# Patient Record
Sex: MALE | Race: WHITE | NOT HISPANIC OR LATINO | URBAN - METROPOLITAN AREA
[De-identification: names, ages, dates, MRNs, and addresses within clinical notes are randomized per-mention and may not be internally consistent; named-entity substitution may affect disease eponyms.]

---

## 2018-06-01 ENCOUNTER — OFFICE VISIT (OUTPATIENT)
Dept: FAMILY MEDICINE | Facility: CLINIC | Age: 28
End: 2018-06-01
Payer: COMMERCIAL

## 2018-06-01 VITALS
OXYGEN SATURATION: 98 % | WEIGHT: 251.75 LBS | TEMPERATURE: 98.5 F | DIASTOLIC BLOOD PRESSURE: 82 MMHG | SYSTOLIC BLOOD PRESSURE: 128 MMHG | HEIGHT: 78 IN | HEART RATE: 84 BPM | BODY MASS INDEX: 29.13 KG/M2

## 2018-06-01 DIAGNOSIS — R09.A2 GLOBUS SENSATION: Primary | ICD-10-CM

## 2018-06-01 DIAGNOSIS — K21.9 GASTROESOPHAGEAL REFLUX DISEASE WITHOUT ESOPHAGITIS: ICD-10-CM

## 2018-06-01 DIAGNOSIS — F41.9 ANXIETY: ICD-10-CM

## 2018-06-01 DIAGNOSIS — G47.09 OTHER INSOMNIA: ICD-10-CM

## 2018-06-01 RX ORDER — TRAZODONE HYDROCHLORIDE 50 MG/1
50 TABLET, FILM COATED ORAL
Qty: 30 TABLET | Refills: 1 | Status: SHIPPED | OUTPATIENT
Start: 2018-06-01 | End: 2018-06-18

## 2018-06-01 NOTE — NURSING NOTE
"27 year old  Chief Complaint   Patient presents with     Throat Problem     Sore throat for 2 months     Patient Request     pt wants to discuss anxiety       Blood pressure 128/82, pulse 84, temperature 98.5  F (36.9  C), temperature source Oral, height 6' 6.15\" (198.5 cm), weight 251 lb 12 oz (114.2 kg), SpO2 98 %. Body mass index is 28.98 kg/(m^2).  There is no problem list on file for this patient.      Wt Readings from Last 2 Encounters:   06/01/18 251 lb 12 oz (114.2 kg)     BP Readings from Last 3 Encounters:   06/01/18 128/82         No current outpatient prescriptions on file.     No current facility-administered medications for this visit.        Social History   Substance Use Topics     Smoking status: Never Smoker     Smokeless tobacco: Never Used     Alcohol use Yes      Comment: 15 drinks a week       Health Maintenance Due   Topic Date Due     TETANUS IMMUNIZATION (SYSTEM ASSIGNED)  12/12/2006     HIV SCREEN (SYSTEM ASSIGNED)  12/12/2006       No results found for: PAP      June 1, 2018 3:52 PM  "

## 2018-06-01 NOTE — PROGRESS NOTES
"Jg Wayne is a 27 year old male, new to [facility]. He is here for the following issues:    Neck symptoms    Jg is a 19 yo male with a two month history of neck discomfort.  He describes an achy sensation and fullness at the base of his neck on the left side. Sometimes it feels bruised to touch. No fever. He has seen an ENT doctor twice and they did not find anything on detailed exam.  He was worried that he might have esophageal cancer. It is not smoke but he does drink 2 to 3 alcoholic beverages a day.  No fevers night sweats.     Heartburn   He describes intermittent heartburn. He was on prilosec 2 yr ago for a period of 3 mos. At that time he was having dysphagia, and globus sensation. An ENT doctor diagnosed him with GERD. Symptoms improved after 3 months of prilosec. . Six months ago, he had a recurrence. He tried prilosec again x 3 months but did not feel it helped this time.  At first, he thought he may have strained a muscle at the left side of his neck because it felt bruised to touch. He is not a smoker. He drinks alcohol,  2 to 3 per day.  Does not drink coffee for the past three weeks, no NSAID use.  Voice sounds a bit raspy.    Anxiety   He reports feeling anxious \"all my life\".  Symptoms worsened after college. No hx of abuse or PTSD. No hx of chemical dependency.  He admits to worrying a lot and describes himself as a hypochondriac. Get panic attacks 1 to 2 times per week.  Sleep pattern has changed. He used to go to bed at 10 o'clock but now feels exhausted by 8 PM and falls asleep. With that, he was awakening from sleep between 3 and 4 AM and then is wide-awake. He feels very sleepy during the day in this trying not to nap.  Is using 5-HTP and melatonin at bedtime. Also taking VALENTINE 350mg daily.  Is never taking prescription medications for anxiety or for sleep. He is interested in counseling.  He met with the counselor twice in the past but has no current therapist.  He reports losing 5 " "pounds due to anxiety. He is unsure if he suffers depression.     PHQ9 score = 19 (no SI)  KVNG 7 score = 18      Patient Active Problem List   Diagnosis     Other insomnia     Anxiety     Gastroesophageal reflux disease without esophagitis     Globus sensation     Family History   Problem Relation Age of Onset     Anxiety Disorder Maternal Aunt      Depression Maternal Aunt      Past Surgical History:   Procedure Laterality Date     NO HISTORY OF SURGERY         Social  , no children   for [employer], stressful job    HABITS:  Tob: never  ETOH: 2-3 per day  Caffeine: none x 3 wks  Exercise: weights once a week    Monogamous with female spouse  Condom use  No hx of STDs    Patient Active Problem List   Diagnosis     Other insomnia     Anxiety     Gastroesophageal reflux disease without esophagitis     Globus sensation       No current outpatient prescriptions on file.       Allergies not on file     EXAM  /82  Pulse 84  Temp 98.5  F (36.9  C) (Oral)  Ht 6' 6.15\" (198.5 cm)  Wt 251 lb 12 oz (114.2 kg)  SpO2 98%  BMI 28.98 kg/m2  Gen: Alert, pleasant, NAD  HEENT:  Conjunctiva nl, TM normal bilaterally, OP clear, no posterior erythema  Neck: no LAD or TM, no tenderness  COR: S1,S2, no murmur  Lungs: CTA bilaterally, no rhonchi, wheezes or rales  Abdomen: soft, midepigastric tenderness, normal bowel sounds, no HSM  Ext: no peripheral edema, pulses full      Assessment:  (F45.8) Globus sensation  (primary encounter diagnosis)  Comment:   He has had 2 ENT visits, unremarkable, DDX is acid reflux with mild dysphagia, anxiety, other process such as lymphadenopathy  Plan: suspect this is more likely stress or acid reflux, treat as below and follow up if not improving    (K21.9) Gastroesophageal reflux disease without esophagitis  Comment: intermittent burning  Plan: CBC with platelets, H Pylori antigen stool,         Comprehensive metabolic panel, CANCELED: H.         Pylori Antibody IGG  " ([facility]), CANCELED:         Comprehensive Metabolic Panel ([place])        Discussed checking for h pylori today and need to stop drinking coffee, alcohol, no NSAIDs  Use prilosec 20mg daily on empty stomach daily x 30 days   Follow up in 2 wks    (F41.9) Anxiety  Comment: ongoing daily anxiety with some panic attack. Noted insomnia  Plan: TSH with free T4 reflex        Recommend looking for reversible causes  Recommend he find a therapist, [employer] and psychology today websites are suggested  Briefly discussed SSRI but will hold on this today, return in 2 wks    (G47.09) Other insomnia  Comment: longstanding issue, trouble falling asleep.   Plan: traZODone (DESYREL) 50 MG tablet        Trial of 25-50mg dose q hs . Recommend he try to push bedtime to 9pm - 9 : 30pm  if possible. No screen use. No napping, stopu using alcohol.     Follow up in 2 wks    Total visit time today 40 minutes.  More than 50% of the time was spent in counseling and coordination of care      Selma López MD  Internal Medicine/Pediatrics

## 2018-06-01 NOTE — LETTER
[facility]  [address]  [place] [address]  Phone: [phone]  Fax: [phone]       Date: June 5, 2018      Jg Wayne  [address]   [place] [address]        Dear Jg,    Enclosed are your test results.    Your thyroid level (TSH) is normal.     Your glucose is slightly low, but kidney and liver tests are normal.     Your blood count profile looks perfect, there is no anemia or other abnormal findings.    The H. Pylori test is pending.    Sincerely,    Selma López MD  Internal Medicine/Pediatrics    Resulted Orders   CBC with platelets   Result Value Ref Range    WBC 7.3 4.0 - 11.0 10e9/L    RBC Count 5.23 4.4 - 5.9 10e12/L    Hemoglobin 16.4 13.3 - 17.7 g/dL    Hematocrit 47.7 40.0 - 53.0 %    MCV 91 78 - 100 fl    MCH 31.4 26.5 - 33.0 pg    MCHC 34.4 31.5 - 36.5 g/dL    RDW 12.4 10.0 - 15.0 %    Platelet Count 255 150 - 450 10e9/L   TSH with free T4 reflex   Result Value Ref Range    TSH 1.27 0.40 - 4.00 mU/L   Comprehensive metabolic panel   Result Value Ref Range    Sodium 140 133 - 144 mmol/L    Potassium 4.9 3.4 - 5.3 mmol/L    Chloride 106 94 - 109 mmol/L    Carbon Dioxide 29 20 - 32 mmol/L    Anion Gap 5 3 - 14 mmol/L    Glucose 66 (L) 70 - 99 mg/dL    Urea Nitrogen 10 7 - 30 mg/dL    Creatinine 1.00 0.66 - 1.25 mg/dL    GFR Estimate 88 >60 mL/min/1.7m2      Comment:      Non  GFR Calc    GFR Estimate If Black >90 >60 mL/min/1.7m2      Comment:       GFR Calc    Calcium 9.2 8.5 - 10.1 mg/dL    Bilirubin Total 0.4 0.2 - 1.3 mg/dL    Albumin 4.4 3.4 - 5.0 g/dL    Protein Total 7.4 6.8 - 8.8 g/dL    Alkaline Phosphatase 63 40 - 150 U/L    ALT 23 0 - 70 U/L    AST 18 0 - 45 U/L

## 2018-06-01 NOTE — MR AVS SNAPSHOT
"              After Visit Summary   2018    Jg Wayne    MRN: [id number]           Patient Information     Date Of Birth          1990        Visit Information        Provider Department      2018 4:00 PM Selma López MD [facility]        Today's Diagnoses     Globus sensation    -  1    Gastroesophageal reflux disease without esophagitis        Anxiety        Other insomnia           Follow-ups after your visit        Who to contact     Please call your clinic at [phone] to:    Ask questions about your health    Make or cancel appointments    Discuss your medicines    Learn about your test results    Speak to your doctor            Additional Information About Your Visit        MyChart Information     [employer] is an electronic gateway that provides easy, online access to your medical records. With [employer], you can request a clinic appointment, read your test results, renew a prescription or communicate with your care team.     To sign up for [employer] visit the website at [email or web address]   You will be asked to enter the access code listed below, as well as some personal information. Please follow the directions to create your username and password.     Your access code is: [email or web address]  Expires: 2018  4:19 PM     Your access code will  in 90 days. If you need help or a new code, please contact your [facility] or call [phone] for assistance.        Care EveryWhere ID     This is your Care EveryWhere ID. This could be used by other organizations to access your [facility] medical records  [id number]        Your Vitals Were     Pulse Temperature Height Pulse Oximetry BMI (Body Mass Index)       84 98.5  F (36.9  C) (Oral) 6' 6.15\" (198.5 cm) 98% 28.98 kg/m2        Blood Pressure from Last 3 Encounters:   18 128/82    Weight from Last 3 Encounters:   18 251 lb 12 oz (114.2 kg)              We Performed the " Following     CBC with platelets     Comprehensive Metabolic Panel ([place])     H. Pylori Antibody IGG  ([facility])     TSH with free T4 reflex          Today's Medication Changes          These changes are accurate as of 6/1/18  4:31 PM.  If you have any questions, ask your nurse or doctor.               Start taking these medicines.        Dose/Directions    traZODone 50 MG tablet   Commonly known as:  DESYREL   Used for:  Other insomnia   Started by:  Selma López MD        Dose:  50 mg   Take 1 tablet (50 mg) by mouth nightly as needed for sleep   Quantity:  30 tablet   Refills:  1            Where to get your medicines      These medications were sent to [facility] [address] [facility] - [place], [place] - [address]  [address], [place] [address]     Phone:  [phone]     traZODone 50 MG tablet                Primary Care Provider Office Phone # Fax #    Paradise Coy PA-C [phone]       [address] [place] [address]        Equal Access to Services     KALEB HERRERA : Hadii rere ku hadasho Soomaali, waaxda luqadaha, qaybta kaalmada adeegyada, mignon merrill . So [facility] [phone].    ATENCIÓN: Si habla español, tiene a ashley disposición servicios gratuitos de asistencia lingüística. Llame al [phone].    We comply with applicable federal civil rights laws and [place] laws. We do not discriminate on the basis of race, color, national origin, age, disability, sex, sexual orientation, or gender identity.            Thank you!     Thank you for choosing [facility]  for your care. Our goal is always to provide you with excellent care. Hearing back from our patients is one way we can continue to improve our services. Please take a few minutes to complete the written survey that you may receive in the mail after your visit with us. Thank you!             Your Updated Medication List - Protect others around you: Learn how to safely  use, store and throw away your medicines at www.disposemymeds.org.          This list is accurate as of 6/1/18  4:31 PM.  Always use your most recent med list.                   Brand Name Dispense Instructions for use Diagnosis    omeprazole 20 MG CR capsule    priLOSEC    90 capsule    Take 1 capsule (20 mg) by mouth daily        traZODone 50 MG tablet    DESYREL    30 tablet    Take 1 tablet (50 mg) by mouth nightly as needed for sleep    Other insomnia

## 2018-06-02 PROBLEM — G47.09 OTHER INSOMNIA: Status: ACTIVE | Noted: 2018-06-02

## 2018-06-02 PROBLEM — F41.9 ANXIETY: Status: ACTIVE | Noted: 2018-06-02

## 2018-06-02 PROBLEM — K21.9 GASTROESOPHAGEAL REFLUX DISEASE WITHOUT ESOPHAGITIS: Status: ACTIVE | Noted: 2018-06-02

## 2018-06-02 PROBLEM — R09.A2 GLOBUS SENSATION: Status: ACTIVE | Noted: 2018-06-02

## 2018-06-02 LAB
ALBUMIN SERPL-MCNC: 4.4 G/DL (ref 3.4–5)
ALP SERPL-CCNC: 63 U/L (ref 40–150)
ALT SERPL W P-5'-P-CCNC: 23 U/L (ref 0–70)
ANION GAP SERPL CALCULATED.3IONS-SCNC: 5 MMOL/L (ref 3–14)
AST SERPL W P-5'-P-CCNC: 18 U/L (ref 0–45)
BILIRUB SERPL-MCNC: 0.4 MG/DL (ref 0.2–1.3)
BUN SERPL-MCNC: 10 MG/DL (ref 7–30)
CALCIUM SERPL-MCNC: 9.2 MG/DL (ref 8.5–10.1)
CHLORIDE SERPL-SCNC: 106 MMOL/L (ref 94–109)
CO2 SERPL-SCNC: 29 MMOL/L (ref 20–32)
CREAT SERPL-MCNC: 1 MG/DL (ref 0.66–1.25)
ERYTHROCYTE [DISTWIDTH] IN BLOOD BY AUTOMATED COUNT: 12.4 % (ref 10–15)
GFR SERPL CREATININE-BSD FRML MDRD: 88 ML/MIN/1.7M2
GLUCOSE SERPL-MCNC: 66 MG/DL (ref 70–99)
HCT VFR BLD AUTO: 47.7 % (ref 40–53)
HGB BLD-MCNC: 16.4 G/DL (ref 13.3–17.7)
MCH RBC QN AUTO: 31.4 PG (ref 26.5–33)
MCHC RBC AUTO-ENTMCNC: 34.4 G/DL (ref 31.5–36.5)
MCV RBC AUTO: 91 FL (ref 78–100)
PLATELET # BLD AUTO: 255 10E9/L (ref 150–450)
POTASSIUM SERPL-SCNC: 4.9 MMOL/L (ref 3.4–5.3)
PROT SERPL-MCNC: 7.4 G/DL (ref 6.8–8.8)
RBC # BLD AUTO: 5.23 10E12/L (ref 4.4–5.9)
SODIUM SERPL-SCNC: 140 MMOL/L (ref 133–144)
TSH SERPL DL<=0.005 MIU/L-ACNC: 1.27 MU/L (ref 0.4–4)
WBC # BLD AUTO: 7.3 10E9/L (ref 4–11)

## 2018-06-04 ASSESSMENT — ANXIETY QUESTIONNAIRES
1. FEELING NERVOUS, ANXIOUS, OR ON EDGE: NEARLY EVERY DAY
IF YOU CHECKED OFF ANY PROBLEMS ON THIS QUESTIONNAIRE, HOW DIFFICULT HAVE THESE PROBLEMS MADE IT FOR YOU TO DO YOUR WORK, TAKE CARE OF THINGS AT HOME, OR GET ALONG WITH OTHER PEOPLE: EXTREMELY DIFFICULT
5. BEING SO RESTLESS THAT IT IS HARD TO SIT STILL: MORE THAN HALF THE DAYS
GAD7 TOTAL SCORE: 18
6. BECOMING EASILY ANNOYED OR IRRITABLE: SEVERAL DAYS
7. FEELING AFRAID AS IF SOMETHING AWFUL MIGHT HAPPEN: NEARLY EVERY DAY
2. NOT BEING ABLE TO STOP OR CONTROL WORRYING: NEARLY EVERY DAY
3. WORRYING TOO MUCH ABOUT DIFFERENT THINGS: NEARLY EVERY DAY

## 2018-06-04 ASSESSMENT — PATIENT HEALTH QUESTIONNAIRE - PHQ9: 5. POOR APPETITE OR OVEREATING: NEARLY EVERY DAY

## 2018-06-05 ASSESSMENT — PATIENT HEALTH QUESTIONNAIRE - PHQ9: SUM OF ALL RESPONSES TO PHQ QUESTIONS 1-9: 19

## 2018-06-05 ASSESSMENT — ANXIETY QUESTIONNAIRES: GAD7 TOTAL SCORE: 18

## 2018-06-18 DIAGNOSIS — G47.09 OTHER INSOMNIA: ICD-10-CM

## 2018-06-18 RX ORDER — TRAZODONE HYDROCHLORIDE 50 MG/1
TABLET, FILM COATED ORAL
Qty: 60 TABLET | Refills: 0 | Status: SHIPPED | OUTPATIENT
Start: 2018-06-18

## 2018-06-18 NOTE — TELEPHONE ENCOUNTER
Patient was seen by Dr.. López on June 1st. 2018.  She started him on Trazodone 50 mg. She wanted him to trial 25- 50 mg. And follow-up in 2 weeks.  He has an appointment for June 22 nd 2018 with Paradise Coy.  He started taking the Trazodone, he thought Dr. López had said if 1 didn't work he could increase it to 2 tabs nightly. That is what he has been doing.  He is out of medication and wondering if he can a refill.      I spoke with Dr. López:  OK to refill  Trazodone 50 mg. 1-2 tabs PO Q-HS 60 tabs with no refills,  follow-up on Friday with Paradise Coy.      Last office visit: 06/01/2018, future appointment 06/22/2018.  Jayy NICHOLS  [facility]  June 18, 2018 4:36 PM

## 2018-06-18 NOTE — TELEPHONE ENCOUNTER
----- Message from Denton Kelsey sent at 6/18/2018  3:33 PM CDT -----  Regarding: Pt said he had a conversation with Paradise Coy that it would be ok to take 2 traZODone daily so he has per Pt  Contact: [phone]  Pt said he had a conversation with Paradise Coy that it would be ok to take 2 traZODone (DESYREL) 50 MG tablet daily so he has been per Pt - the script ordered on 6/1/18 is for 1 daily so he is running out. Please call Pt to clarify at [phone]      Thanks Jd

## 2018-07-16 ENCOUNTER — TELEPHONE (OUTPATIENT)
Dept: PEDIATRICS | Facility: CLINIC | Age: 28
End: 2018-07-16

## 2018-07-16 DIAGNOSIS — G47.09 OTHER INSOMNIA: ICD-10-CM

## 2018-07-16 RX ORDER — TRAZODONE HYDROCHLORIDE 50 MG/1
TABLET, FILM COATED ORAL
Qty: 60 TABLET | Refills: 0 | Status: CANCELLED | OUTPATIENT
Start: 2018-07-16

## 2018-07-16 NOTE — TELEPHONE ENCOUNTER
Refill request received from pharmacy    trazodone      Last Written Prescription Date:  6/18/18  Last Fill Quantity: 60,   # refills: 0  Last Office Visit: 6/01/18  Future Office visit:   None- was a no show to 7/10/18 appointment.    Routing to Scheduling Pool to:  Call the patient and assist with scheduling then forward to PCP/Provider for refill review.    Due for:  Medication follow up  Patient has already been given 1 chon refill.      Ann Fletcher RN   [facility], Primary Care

## 2018-07-17 DIAGNOSIS — G47.09 OTHER INSOMNIA: ICD-10-CM

## 2018-07-17 RX ORDER — TRAZODONE HYDROCHLORIDE 50 MG/1
TABLET, FILM COATED ORAL
Qty: 60 TABLET | Refills: 0 | OUTPATIENT
Start: 2018-07-17

## 2018-07-17 NOTE — TELEPHONE ENCOUNTER
Routing refill request to provider for review/approval because:  Vandana given x1 and patient did not follow up, please advise  Patient needs to be seen because:  You asked him to rtn to clinic in 2 weeks after his 6/1/18 appt with you.  He got 60 tabs trazodone on 6/18/18      Do you want to deny refill?, decrease amount from 60?     Sarahi Izaguirre RN  July 17, 2018 9:06 AM          Per P[place],  She is denying this med      Sarahi Izaguirre RN  July 17, 2018 12:27 PM

## 2018-07-17 NOTE — TELEPHONE ENCOUNTER
Called pharmacy and told them med denied.  Pt must have appt if any more refills of this med.  They will tell pt if he calls.  We have been unable to connect with pt via phone    Sarahi Izaguirre RN  July 17, 2018 1:28 PM

## 2018-07-17 NOTE — TELEPHONE ENCOUNTER
Last office visit:  06/01/2018, no future appointment.  Needs follow-up before refills.  I have tried to call this patient to set up an appointment, but his cell phone is not taking messages.

## 2018-08-13 DIAGNOSIS — G47.09 OTHER INSOMNIA: ICD-10-CM

## 2018-08-13 RX ORDER — TRAZODONE HYDROCHLORIDE 50 MG/1
TABLET, FILM COATED ORAL
Qty: 60 TABLET | Refills: 0 | Status: CANCELLED | OUTPATIENT
Start: 2018-08-13

## 2018-08-13 NOTE — TELEPHONE ENCOUNTER
Refill request for Trazodone 50 mg tablets for insomnia    Routing refill request to provider for review/approval because:  Vandana given x1 and patient did not follow up, please advise      Per note from Gerson Verma LPN, 6/18/18:  I spoke with Dr. López:  OK to refill  Trazodone 50 mg. 1-2 tabs PO Q-HS 60 tabs with no refills,  follow-up on Friday with Paradise Coy.    Patient was no show for this appt with Paradise Coy PA-C on 6/22/18 and No show with Dr. López on 7/10/18    Pharmacy was notified of med denial on 7/17/18, must have office visits for refills.  Was unable to reach patient.    Routing to team to review and advise.    Terri Gupta RN, [facility]          Will deny trazodone 50 mg script as pt has not followed up in clinic as instructed.  Left message with pharmacy that med denied until pt contacts clinic - unable to reach pt.  Rocío Cuba RN  Care Coordinator  [facility]

## 2019-08-02 ENCOUNTER — OFFICE VISIT (OUTPATIENT)
Dept: FAMILY MEDICINE | Facility: CLINIC | Age: 29
End: 2019-08-02
Payer: COMMERCIAL

## 2019-08-02 VITALS
BODY MASS INDEX: 30.89 KG/M2 | SYSTOLIC BLOOD PRESSURE: 131 MMHG | RESPIRATION RATE: 16 BRPM | WEIGHT: 267 LBS | DIASTOLIC BLOOD PRESSURE: 85 MMHG | TEMPERATURE: 98.4 F | HEIGHT: 78 IN | OXYGEN SATURATION: 97 % | HEART RATE: 88 BPM

## 2019-08-02 DIAGNOSIS — K21.9 GASTROESOPHAGEAL REFLUX DISEASE, ESOPHAGITIS PRESENCE NOT SPECIFIED: ICD-10-CM

## 2019-08-02 DIAGNOSIS — Z00.00 ENCOUNTER FOR PREVENTIVE HEALTH EXAMINATION: Primary | ICD-10-CM

## 2019-08-02 DIAGNOSIS — R07.89 CHEST TIGHTNESS: ICD-10-CM

## 2019-08-02 DIAGNOSIS — Z87.898 HISTORY OF ATYPICAL NEVUS: ICD-10-CM

## 2019-08-02 LAB
CHOLEST SERPL-MCNC: 218 MG/DL
HDLC SERPL-MCNC: 45 MG/DL
LDLC SERPL CALC-MCNC: 148 MG/DL
NONHDLC SERPL-MCNC: 173 MG/DL
TRIGL SERPL-MCNC: 126 MG/DL

## 2019-08-02 ASSESSMENT — MIFFLIN-ST. JEOR: SCORE: 2309.11

## 2019-08-02 ASSESSMENT — ANXIETY QUESTIONNAIRES
GAD7 TOTAL SCORE: 6
IF YOU CHECKED OFF ANY PROBLEMS ON THIS QUESTIONNAIRE, HOW DIFFICULT HAVE THESE PROBLEMS MADE IT FOR YOU TO DO YOUR WORK, TAKE CARE OF THINGS AT HOME, OR GET ALONG WITH OTHER PEOPLE: SOMEWHAT DIFFICULT
1. FEELING NERVOUS, ANXIOUS, OR ON EDGE: SEVERAL DAYS
6. BECOMING EASILY ANNOYED OR IRRITABLE: NOT AT ALL
3. WORRYING TOO MUCH ABOUT DIFFERENT THINGS: SEVERAL DAYS
7. FEELING AFRAID AS IF SOMETHING AWFUL MIGHT HAPPEN: SEVERAL DAYS
5. BEING SO RESTLESS THAT IT IS HARD TO SIT STILL: NOT AT ALL
2. NOT BEING ABLE TO STOP OR CONTROL WORRYING: MORE THAN HALF THE DAYS

## 2019-08-02 ASSESSMENT — PATIENT HEALTH QUESTIONNAIRE - PHQ9
5. POOR APPETITE OR OVEREATING: SEVERAL DAYS
SUM OF ALL RESPONSES TO PHQ QUESTIONS 1-9: 7

## 2019-08-02 NOTE — PROGRESS NOTES
SUBJECTIVE:   CC: gJ Wayne is an 28 year old male with a past medical history significant for GERD, insomnia, and anxiety who presents for preventative health visit.       Healthy Habits:    Do you get at least three servings of calcium containing foods daily (dairy, green leafy vegetables, etc.)? Yes.    Amount of exercise or daily activities, outside of work: Not currently exercising. Has historically enjoyed playing basketball. He and his wife would like to become more active.    Have you had an eye exam in the past two years? Yes.    Do you see a dentist twice per year? Yes.    Do you have sleep apnea, excessive snoring or daytime drowsiness? Yes.    Sleep: 6-7 hours/night. Bad sleep over the past 2 weeks secondary to work and increased stress levels, has used trazodone as needed in the past.     Caffeine: 3-4 cups/day of coffee, trying to cut down. Last cup is usually prior to 12 pm.    Water: Drinks one cup of water for every cup of coffee.     Past Medical History:   Diagnosis Date     Anxiety      GERD (gastroesophageal reflux disease)      Insomnia      Past Surgical History:   Procedure Laterality Date     HC TOOTH EXTRACTION W/FORCEP       Family History   Problem Relation Age of Onset     Anxiety Disorder Maternal Aunt      Depression Maternal Aunt      Heart Defect Father         Enlarged aortic root     Sleep Apnea Mother      No Known Problems Brother      No Known Problems Maternal Grandmother      Skin Cancer Maternal Grandfather      No Known Problems Paternal Grandmother      Social History     Socioeconomic History     Marital status:      Spouse name: Not on file     Number of children: 0     Years of education: 16     Highest education level: Not on file   Occupational History     Not on file   Social Needs     Financial resource strain: Not on file     Food insecurity:     Worry: Not on file     Inability: Not on file     Transportation needs:     Medical: Not on file      Non-medical: Not on file   Tobacco Use     Smoking status: Never Smoker     Smokeless tobacco: Never Used   Substance and Sexual Activity     Alcohol use: Yes     Comment: Average 2-3 drinks daily     Drug use: No     Sexual activity: Yes     Partners: Female     Birth control/protection: Condom   Lifestyle     Physical activity:     Days per week: Not on file     Minutes per session: Not on file     Stress: Not on file   Relationships     Social connections:     Talks on phone: Not on file     Gets together: Not on file     Attends Episcopal service: Not on file     Active member of club or organization: Not on file     Attends meetings of clubs or organizations: Not on file     Relationship status: Not on file     Intimate partner violence:     Fear of current or ex partner: Not on file     Emotionally abused: Not on file     Physically abused: Not on file     Forced sexual activity: Not on file   Other Topics Concern     Not on file   Social History Narrative    Originally from [place]. Works for [employer], [employer], works with [employer].  with two dogs. He and his wife live in an [place]. Wife works remotely for company in [place], [place].        Today's PHQ-2 Score:   PHQ-2 ( 1999 Pfizer) 8/2/2019   Q1: Little interest or pleasure in doing things 0   Q2: Feeling down, depressed or hopeless 1   PHQ-2 Score 1       Today's PHQ-9 Score:   PHQ-9 SCORE 6/4/2018 8/2/2019   PHQ-9 Total Score 19 7       Today's KVNG-7 Score:   KVNG-7 SCORE 6/4/2018 8/2/2019   Total Score 18 6       If you drink alcohol do you typically have >3 drinks per day or >7 drinks per week? Yes, 2-3 drinks daily.     Last PSA: No results found for: PSA    Reviewed orders with patient. Reviewed health maintenance and updated orders accordingly - Yes.    Reviewed and updated as needed this visit by clinical staff    Reviewed and updated as needed this visit by Provider    ROS:  CONSTITUTIONAL:  "NEGATIVE for fever, chills, change in weight  INTEGUMENTARY/SKIN: NEGATIVE for worrisome rashes, moles or lesions  EYES: NEGATIVE for vision changes or irritation  ENT: NEGATIVE for ear, mouth and throat problems  RESP: NEGATIVE for significant cough or SOB  CV: Reports 2 week history of intermittent chest tightness which he suspects is secondary to anxiety. Symptoms are more prominent when thinking about work or going to work. Has had increased stress at work, now improved since he recently completed a big presentation. Admits to some shortness of breath with the chest tightness, denies heart palpitations, lightheadedness, dizziness, or activity intolerance. FHx significant for enlarged aortic root in his father. No other significant family history of cardiac disease, however father is adopted.   GI: NEGATIVE for nausea, abdominal pain, or change in bowel habits. Hx of GERD, prescribed omeprazole, takes intermittently for 2-4 weeks at a time over the past 2 years.    male: negative for dysuria, hematuria, decreased urinary stream, erectile dysfunction, urethral discharge  MUSCULOSKELETAL: NEGATIVE for significant arthralgias or myalgia  NEURO: NEGATIVE for weakness, dizziness or paresthesias  PSYCHIATRIC: NEGATIVE for changes in mood or affect    OBJECTIVE:   /85 (BP Location: Left arm, Patient Position: Chair, Cuff Size: Adult Large)   Pulse 88   Temp 98.4  F (36.9  C) (Oral)   Resp 16   Ht 1.989 m (6' 6.3\")   Wt 121.1 kg (267 lb)   SpO2 97%   BMI 30.62 kg/m       EXAM:  Constitutional: appears to be in no acute distress, comfortable, pleasant.   Eyes: anicteric, conjunctiva clear without drainage or erythema. FAWAD, red reflex present bilaterally.   Ears, Nose and Throat: tympanic membranes gray with LR,  nose without nasal discharge. OP: no erythema to posterior pharynx, negative post nasal drainage, tonsils +1 no erythema or exudate.  Neck: supple, thyroid palpable,not enlarged, no nodules "   Cardiovascular: regular rate and rhythm, normal S1 and S2, no murmurs, rubs or gallops, peripheral pulses full and symmetric; negative peripheral edema   Respiratory: Air entry throughout. Breathing pattern unlabored without the use of accessory muscles. Clear to auscultation A and P, no wheezes or crackles, normal breath sounds.    Gastrointestinal: rounded, soft. Positive bowel sounds x4, nontender, no masses.   Musculoskeletal: full range of motion, no edema.   Skin: pink, turgor smooth and elastic. Negative for lesions or dryness.  Neurological: normal gait, no tremor.   Psychological: appropriate mood and affect.   Lymphatic: no cervical, axillary, supraclavicular, or infraclavicular lymphadenopathy.    Laboratory testing pending:  Lipid panel reflex to direct LDL Fasting    EKG: Normal Sinus Rhythm, HR 61    ASSESSMENT/PLAN:     1. Encounter for preventive health examination  Comment: Age appropriate screening and preventative care have been addressed today. Patient reports he was screened for HIV around age 15, results non-reactive, he is opal  Monogamous relationship and no risk factors for HIV were identified today. He declines STI screening today. Vaccinations have been reviewed, unfortunately patient is originally from [place] so no records available per [facility], patient reports he received all routine vaccines in childhood. It is unknown when last tetanus took place, patient suspects >10 years ago. He declines tetanus booster today but is willing to return to clinic to have this done in the future. He thinks he has had HPV vaccines completed in the past, will verify with home records. Patient has been advised to follow a balanced diet with reduction in cholesterol, and reasonable portion sizes. They have been advised to undertake routine aerobic activity and they were counseled on healthy weight maintenance. Reviewed testicular cancer and prostate cancer screening guidelines. Recommend annual vision exams  as well as biannual dental exams. Discussed safety items such as functioning smoke and carbon monoxide detectors as well as 100% compliance with seatbelts and SPF 30 to all sun exposed areas. They will follow up for annual physical again in 1-2 years.   Plan:   - Lipid panel reflex to direct LDL Fasting   - TDAP VACCINE (BOOSTRIX); Future   - ADMIN VACCINE, INITIAL    2. Gastroesophageal reflux disease, esophagitis presence not specified  Comment: Pt reports history of acid reflux, currently taking omeprazole but is not consistent with this. Will sometimes stop for 2-4 weeks at a time. Has been using this medication for the past 2 years. Recommend trial of discontinuing PPI and starting H2RA such as ranitidine 150 mg 1-2 times daily. Discussed risks of long term PPI use. Discussed lifestyle measures to help prevent GERD as outlined below  Plan:   - Discontinue omeprazole 20 mg daily   - Ranitidine (ZANTAC) 150 mg tablet: Take 1 tablet 1-2 times daily for acid reflux   - Consider lifestyle and dietary changes as outlined below   - Counseled on reasons to return to clinic    3. Chest tightness  Comment: Reports 2 week hx of intermittent chest tightness which he suspects is secondary to anxiety. Symptoms are more prominent when thinking about work or going to work. Has had increased stress at work, now improved since he recently completed a big presentation. Admits to some shortness of breath with the chest tightness, denies heart palpitations, lightheadedness, dizziness, or activity intolerance. FHx significant for enlarged aortic root in his father. No other significant family history of cardiac disease, however father is adopted. EKG today is unremarkable. Vital signs stable with exception of borderline blood pressure. We discussed BP goals, will continue to monitor this. Recommend continued monitoring of chest tightness and counseled on reasons to return to clinic including red flag symptoms warranting immediate  attention. I think pt would benefit from better management of stress as well as improved sleep patterns. We discussed importance of sleep hygiene today as outlined below.   Plan:   - Continue to monitor   - EKG 12-lead complete w/read - Clinics   - Counseled on reasons to return to clinic or seek emergency care    4. History of atypical nevus  Comment: Pt reports history of atypical nevus, has had biopsies and removals in the past however no history of confirmed skin cancer personally. FHx is significant for skin cancer in maternal grandfather. Recommend annual skin checks with dermatology, pt is agreeable to this.  Plan:   - DERMATOLOGY REFERRAL      Follow-up: Lab results pending, will follow-up as indicated. Return to clinic in 1-2 years for routine health maintenance exam.       COUNSELING:  Reviewed preventive health counseling, as reflected in patient instructions  Special attention given to:        Regular exercise       Healthy diet/nutrition       Vision screening       Immunizations    Declined: TDAP due to Conscientious objector           Safe sex practices/STD prevention       HIV screeninx in teen years, 1x in adult years, and at intervals if high risk       Prostate cancer screening       Osteoporosis Prevention/Bone Health    BP Screening:   Last 3 BP Readings:    BP Readings from Last 3 Encounters:   19 131/85   18 128/82       The following was recommended to the patient:  Re-screen BP within a year and recommended lifestyle modifications  Patient is a non-smoker.   Body mass index is 30.62 kg/m .  Weight management plan: Discussed healthy diet and exercise guidelines    Counseling Resources:  ATP IV Guidelines  Pooled Cohorts Equation Calculator  FRAX Risk Assessment  ICSI Preventive Guidelines  Dietary Guidelines for Americans,   USDA's MyPlate  ASA Prophylaxis  Lung CA Screening    MARYLU Atkins CNP  [facility]

## 2019-08-02 NOTE — NURSING NOTE
"28 year old  Chief Complaint   Patient presents with     Physical     Pt. presents to the clinic today for a physical exam.        Blood pressure 131/85, pulse 88, temperature 98.4  F (36.9  C), temperature source Oral, resp. rate 16, height 1.989 m (6' 6.3\"), weight 121.1 kg (267 lb), SpO2 97 %. Body mass index is 30.62 kg/m .  BP completed using cuff size:    Patient Active Problem List   Diagnosis     Other insomnia     Anxiety     Gastroesophageal reflux disease without esophagitis     Globus sensation       Wt Readings from Last 2 Encounters:   08/02/19 121.1 kg (267 lb)   06/01/18 114.2 kg (251 lb 12 oz)     BP Readings from Last 3 Encounters:   08/02/19 131/85   06/01/18 128/82       No Known Allergies    Current Outpatient Medications   Medication     omeprazole (PRILOSEC) 20 MG CR capsule     5-HTP CAPS     traZODone (DESYREL) 50 MG tablet     No current facility-administered medications for this visit.        Social History     Tobacco Use     Smoking status: Never Smoker     Smokeless tobacco: Never Used   Substance Use Topics     Alcohol use: Yes     Comment: 2-3 per day     Drug use: No       Honoring Choices - Health Care Directive Guide offered to patient at time of visit.    Health Maintenance Due   Topic Date Due     PREVENTIVE CARE VISIT  1988     HIV SCREENING  12/12/2003     DTAP/TDAP/TD IMMUNIZATION (1 - Tdap) 12/12/2013     PHQ-9  12/01/2018         There is no immunization history on file for this patient.    No results found for: PAP      Recent Labs   Lab Test 06/01/18  1644   ALT 23   CR 1.00   GFRESTIMATED 88   GFRESTBLACK >90   ALBUMIN 4.4   POTASSIUM 4.9   TSH 1.27       PHQ-2 ( 1999 Pfizer) 8/2/2019   Q1: Little interest or pleasure in doing things 0   Q2: Feeling down, depressed or hopeless 1   PHQ-2 Score 1       PHQ-9 SCORE 6/4/2018 8/2/2019   PHQ-9 Total Score 19 7       KVNG-7 SCORE 6/4/2018 8/2/2019   Total Score 18 6     EKG was done.    No flowsheet data found.    Ricarda " MARIELA Rao  August 2, 2019 8:15 AM

## 2019-08-02 NOTE — PATIENT INSTRUCTIONS
Prostate Cancer Screening:  For men aged 55 to 69 years, the decision to undergo periodic prostate-specific antigen (PSA)-based screening for prostate cancer should be an individual one. Before deciding whether to be screened, men should have an opportunity to discuss the potential benefits and harms of screening with their clinician and to incorporate their values and preferences in the decision. Screening offers a small potential benefit of reducing the chance of death from prostate cancer in some men. However, many men will experience potential harms of screening, including false-positive results that require additional testing and possible prostate biopsy; overdiagnosis and overtreatment; and treatment complications, such as incontinence and erectile dysfunction. In determining whether this service is appropriate in individual cases, patients and clinicians should consider the balance of benefits and harms on the basis of family history, race/ethnicity, comorbid medical conditions, patient values about the benefits and harms of screening and treatment-specific outcomes, and other health needs. Clinicians should not screen men who do not express a preference for screening.    The USPSTF recommends against PSA-based screening for prostate cancer in men 70 years and older.    Testicular Cancer Screening: There is inadequate evidence that screening asymptomatic patients by means of self-examination or clinician examination has greater yield or accuracy for detecting testicular cancer at more curable stages. Screening by self-examination or clinician examination is unlikely to offer meaningful health benefits, given the very low incidence and high cure rate of even advanced testicular cancer. Potential harms include false-positive results, anxiety, and harms from diagnostic tests or procedures.    Acid reflux: Consider trying ranitidine (Zantac) 150 mg 1-2 times daily. Stop omeprazole during this time.     Patient  Education     GERD (Adult)    The esophagus is a tube that carries food from the mouth to the stomach. A valve (the LES, lower esophageal sphincter) at the lower end of the esophagus prevents stomach acid from flowing upward. When this valve doesn't work properly, stomach contents may repeatedly flow back up (reflux) into the esophagus. This is called gastroesophageal reflux disease (GERD). GERD can irritate the esophagus. It can cause problems with pain, swallowing or breathing. In severe cases, GERD can cause recurrent pneumonia (from aspiration or breathing in particles) or other serious problems.  Symptoms of reflux include burning, pressure or sharp pain in the upper abdomen or mid to lower chest. The pain can spread to the neck, back, or shoulder. There may be belching, an acid taste in the back of the throat, chronic cough, or sore throat, or hoarseness. GERD symptoms often occur during the day after a big meal. They can also occur at night when lying down.   Home care  Lifestyle changes can help reduce symptoms. If needed, your healthcare provider may prescribe medicines. Symptoms often improve with treatment, but if treatment is stopped, the symptoms often return after a few months. So most persons with GERD will need to continue treatment or get treatment on and off.  Lifestyle changes    Limit or avoid fatty, fried, and spicy foods, as well as coffee, chocolate, mint, and foods with high acid content such as tomatoes and citrus fruit and juices (orange, grapefruit, lemon).    Don t eat large meals, especially at night. Frequent, smaller meals are best. Don't lie down right after eating. And don t eat anything 3 hours before going to bed.    Don't drink alcohol or smoke. As much as possible, stay away from second hand smoke.    If you are overweight, losing weight will reduce symptoms.     Don't wear tight clothing around your stomach area.    If your symptoms occur during sleep, use a foam wedge to  "elevate your upper body (not just your head.) Or, place 4\" blocks under the head of your bed. Or use 2 bed risers under your bedframe.  Medicines  If needed, medicines can help relieve the symptoms of GERD and prevent damage to the esophagus. Discuss a medicine plan with your healthcare provider. This may include one or more of the following medicines:    Antacids to help neutralize the normal acids in your stomach.    Acid blockers (Histamine or H2 blockers) to decrease acid production.    Acid inhibitors (proton pump inhibitors PPIs) to decrease acid production in a different way than the blockers. They may work better, but can take a little longer to take effect.  Take an antacid 30 to 60 minutes after eating and at bedtime, but not at the same time as an acid blocker.Try not to take medicines such as ibuprofen and aspirin. If you are taking aspirin for your heart or other medical reasons, talk to your healthcare provider about stopping it.  Follow-up care  Follow up with your healthcare provider or as advised by our staff.  When to seek medical advice  Call your healthcare provider if any of the following occur:    Stomach pain gets worse or moves to the lower right abdomen (appendix area)    Chest pain appears or gets worse, or spreads to the back, neck, shoulder, or arm    An over-the-counter trial of medicine doesn't relieve your symptoms    Weight loss that can't be explained    Trouble or pain swallowing    Frequent vomiting (can t keep down liquids)    Blood in the stool or vomit (red or black in color)    Feeling weak or dizzy    Fever of 100.4 F (38 C) or higher, or as directed by your healthcare provider  Date Last Reviewed: 3/1/2018    9326-4813 [employer]. [address] [place] [address], [place] [address]. All rights reserved. This information is not intended as a substitute for professional medical care. Always follow your healthcare professional's instructions.    Patient Education   Tips for " "Sleep Hygiene  \"Sleep hygiene\" means having good sleep habits.Follow these tips to sleep better at night:     Get on a schedule. Go to bed and get up at about the same time every day.    Listen to your body. Only try to sleep when you actually feel tired or sleepy.    Be patient. If you haven't been able to get to sleep after about 30 minutes or more, get up and do something calming or boring until you feel sleepy. Then return to bed and try again.    Don't have caffeine (coffee, tea, cola drinks, chocolate and some medicines), alcohol or nicotine (cigarettes). These can make it harder for you to fall asleep and stay asleep.    Use your bed for sleeping only. That means no TV, computer or homework in bed, especially during the evening and before bedtime.    Don't nap during the day. If you must nap, make sure it is for less than 20 minutes.    Create sleep rituals that remind your body it is time to sleep. Examples include breathing exercises, stretching or reading a book.    Avoid all electronic media (smart phone, computer, tablet) within 2 hours of bed time. The \"blue light\" in these devices activates the part of the brain that keeps you awake.    Dim the lights at night.    Get early morning sources of light (walk in the sunshine) to help set sleep patterns at night.    Try a bath or shower before bed. Having a warm bath 1 to 2 hours before bedtime can help you feel sleepy. Hot baths can make you alert, so be mindful of the temperature.    Don't watch the clock. Checking the clock during the night can wake you up. It can also lead to negative thoughts such as, \"I will never fall asleep,\" which can increase anxiety and sleeplessness.    Use a sleep diary. Track your sleep schedule to know your sleep patterns and to see where you can improve.    Get regular exercise every day. Try not to do heavy exercise in the 4 hours before bedtime.    Eat a healthy, balanced diet.    Try eating a light, healthy snack before " bed, but avoid eating a heavy meal.    Create the right sleeping area. A cool, dark, quiet room is best. If needed, try earplugs, fans and blackout curtains.    Keep your daytime routine the same even if you have a bad night sleep. Avoiding activities the next day can make it harder to sleep.  For informational purposes only. Not to replace the advice of your health care provider.   Copyright   2013 [employer] [facility]. All rights reserved. [employer] [id number] - 01/16.     Preventive Health Recommendations  Male Ages 26 - 39    Yearly exam:             See your health care provider every year in order to  o   Review health changes.   o   Discuss preventive care.    o   Review your medicines if your doctor has prescribed any.    You should be tested each year for STDs (sexually transmitted diseases), if you re at risk.     After age 35, talk to your provider about cholesterol testing. If you are at risk for heart disease, have your cholesterol tested at least every 5 years.     If you are at risk for diabetes, you should have a diabetes test (fasting glucose).  Shots: Get a flu shot each year. Get a tetanus shot every 10 years.     Nutrition:    Eat at least 5 servings of fruits and vegetables daily.     Eat whole-grain bread, whole-wheat pasta and brown rice instead of white grains and rice.     Get adequate Calcium and Vitamin D.     Lifestyle    Exercise for at least 150 minutes a week (30 minutes a day, 5 days a week). This will help you control your weight and prevent disease.     Limit alcohol to one drink per day.     No smoking.     Wear sunscreen to prevent skin cancer.     See your dentist every six months for an exam and cleaning.

## 2019-08-03 ASSESSMENT — ANXIETY QUESTIONNAIRES: GAD7 TOTAL SCORE: 6

## 2019-08-06 ENCOUNTER — ANCILLARY PROCEDURE (OUTPATIENT)
Dept: ULTRASOUND IMAGING | Facility: CLINIC | Age: 29
End: 2019-08-06
Attending: NURSE PRACTITIONER
Payer: COMMERCIAL

## 2019-08-06 ENCOUNTER — OFFICE VISIT (OUTPATIENT)
Dept: FAMILY MEDICINE | Facility: CLINIC | Age: 29
End: 2019-08-06
Payer: COMMERCIAL

## 2019-08-06 VITALS
HEIGHT: 78 IN | WEIGHT: 266 LBS | DIASTOLIC BLOOD PRESSURE: 85 MMHG | TEMPERATURE: 98.5 F | SYSTOLIC BLOOD PRESSURE: 125 MMHG | RESPIRATION RATE: 16 BRPM | BODY MASS INDEX: 30.78 KG/M2 | OXYGEN SATURATION: 98 % | HEART RATE: 79 BPM

## 2019-08-06 DIAGNOSIS — G44.209 TENSION HEADACHE: ICD-10-CM

## 2019-08-06 DIAGNOSIS — R22.1 NECK MASS: ICD-10-CM

## 2019-08-06 DIAGNOSIS — R59.1 LYMPHADENOPATHY: Primary | ICD-10-CM

## 2019-08-06 LAB
BASOPHILS # BLD AUTO: 0 10E9/L (ref 0–0.2)
BASOPHILS NFR BLD AUTO: 0.6 %
DIFFERENTIAL METHOD BLD: NORMAL
EOSINOPHIL # BLD AUTO: 0 10E9/L (ref 0–0.7)
EOSINOPHIL NFR BLD AUTO: 0.4 %
ERYTHROCYTE [DISTWIDTH] IN BLOOD BY AUTOMATED COUNT: 12.3 % (ref 10–15)
HCT VFR BLD AUTO: 50.7 % (ref 40–53)
HGB BLD-MCNC: 16.6 G/DL (ref 13.3–17.7)
IMM GRANULOCYTES # BLD: 0 10E9/L (ref 0–0.4)
IMM GRANULOCYTES NFR BLD: 0.4 %
LYMPHOCYTES # BLD AUTO: 2.1 10E9/L (ref 0.8–5.3)
LYMPHOCYTES NFR BLD AUTO: 29.5 %
MCH RBC QN AUTO: 30.3 PG (ref 26.5–33)
MCHC RBC AUTO-ENTMCNC: 32.7 G/DL (ref 31.5–36.5)
MCV RBC AUTO: 93 FL (ref 78–100)
MONOCYTES # BLD AUTO: 0.5 10E9/L (ref 0–1.3)
MONOCYTES NFR BLD AUTO: 6.7 %
NEUTROPHILS # BLD AUTO: 4.5 10E9/L (ref 1.6–8.3)
NEUTROPHILS NFR BLD AUTO: 62.4 %
NRBC # BLD AUTO: 0 10*3/UL
NRBC BLD AUTO-RTO: 0 /100
PLATELET # BLD AUTO: 267 10E9/L (ref 150–450)
RBC # BLD AUTO: 5.48 10E12/L (ref 4.4–5.9)
TSH SERPL DL<=0.005 MIU/L-ACNC: 1.12 MU/L (ref 0.4–4)
WBC # BLD AUTO: 7.2 10E9/L (ref 4–11)

## 2019-08-06 ASSESSMENT — MIFFLIN-ST. JEOR: SCORE: 2295.06

## 2019-08-06 NOTE — NURSING NOTE
"28 year old  Chief Complaint   Patient presents with     Consult     Pt. presents to the clinic today with a lump on the lower left side of his neck X 2 days ago.        Blood pressure 125/85, pulse 79, temperature 98.5  F (36.9  C), temperature source Oral, resp. rate 16, height 1.974 m (6' 5.7\"), weight 120.7 kg (266 lb), SpO2 98 %. Body mass index is 30.98 kg/m .  BP completed using cuff size:    Patient Active Problem List   Diagnosis     Other insomnia     Anxiety     Gastroesophageal reflux disease without esophagitis     Globus sensation       Wt Readings from Last 2 Encounters:   08/06/19 120.7 kg (266 lb)   08/02/19 121.1 kg (267 lb)     BP Readings from Last 3 Encounters:   08/06/19 125/85   08/02/19 131/85   06/01/18 128/82       No Known Allergies    Current Outpatient Medications   Medication     5-HTP CAPS     omeprazole (PRILOSEC) 20 MG CR capsule     ranitidine (ZANTAC) 150 MG tablet     traZODone (DESYREL) 50 MG tablet     No current facility-administered medications for this visit.        Social History     Tobacco Use     Smoking status: Never Smoker     Smokeless tobacco: Never Used   Substance Use Topics     Alcohol use: Yes     Comment: Average 2-3 drinks daily     Drug use: No       Honoring Choices - Health Care Directive Guide offered to patient at time of visit.    Health Maintenance Due   Topic Date Due     HIV SCREENING  12/12/2003     DTAP/TDAP/TD IMMUNIZATION (1 - Tdap) 12/12/2013         There is no immunization history on file for this patient.    No results found for: PAP      Recent Labs   Lab Test 08/02/19  0909 06/01/18  1644   *  --    HDL 45  --    TRIG 126  --    ALT  --  23   CR  --  1.00   GFRESTIMATED  --  88   GFRESTBLACK  --  >90   ALBUMIN  --  4.4   POTASSIUM  --  4.9   TSH  --  1.27       PHQ-2 ( 1999 Pf[employer]) 8/2/2019   Q1: Little interest or pleasure in doing things 0   Q2: Feeling down, depressed or hopeless 1   PHQ-2 Score 1       PHQ-9 SCORE 6/4/2018 8/2/2019 "   PHQ-9 Total Score 19 7       KVNG-7 SCORE 6/4/2018 8/2/2019   Total Score 18 6       No flowsheet data found.    Ricarda Rao CMA  August 6, 2019 11:04 AM

## 2019-08-06 NOTE — PATIENT INSTRUCTIONS
Neck mass  CBC with differential  US head and neck  TSH with reflex T4    Headache, tension likely  Relaxation, regular exercise, neck stretches. Check ergonomics or work space.  Reduce computer glare   Acetaminophen as per package

## 2019-08-06 NOTE — PROGRESS NOTES
HPI       Jg Wayne is a 28 year old  who presents for   Chief Complaint   Patient presents with     Consult     Pt. presents to the clinic today with a lump on the lower left side of his neck X 2 days ago.    28 year-old male presents to clinic for evaluation of left sided neck lump first noted about 2 days ago. Has history of GERD and notes that when he gets reflux symptoms the pain usually radiates to that area. Denies fever chills, weight changes, fatigue. No current URI or allergy symptoms.     Good health overall. Some anxiety.He is working on this and has avoided medication to date. Trying to exercise more.     Recent headaches which occur mid day possibly related to stress or computer use. Denies weakness, numbness or tingling in extremities, nausea or vomiting. No history of migraines. No aura with these headaches.        Problem, Medication and Allergy Lists were   reviewed and updated if needed.     Patient Active Problem List    Diagnosis Date Noted     Other insomnia 06/02/2018     Priority: Medium     Anxiety 06/02/2018     Priority: Medium     Gastroesophageal reflux disease without esophagitis 06/02/2018     Priority: Medium     Globus sensation 06/02/2018     Priority: Medium         Current Outpatient Medications   Medication Sig Dispense Refill     ranitidine (ZANTAC) 150 MG tablet Take 1 tablet (150 mg) by mouth 2 times daily 60 tablet 1     traZODone (DESYREL) 50 MG tablet 1-2 tabs PO Q-HS prn insomnia 60 tablet 0       No Known Allergies.    Patient is   a new patient to this clinic and so  I reviewed/updated the Past Medical History, the Family History and the Social History   Past Medical History:   Diagnosis Date     Anxiety      GERD (gastroesophageal reflux disease)      Insomnia      Family History   Problem Relation Age of Onset     Anxiety Disorder Maternal Aunt      Depression Maternal Aunt      Heart Defect Father         Enlarged aortic root     Sleep Apnea Mother       No Known Problems Brother      No Known Problems Maternal Grandmother      Skin Cancer Maternal Grandfather      No Known Problems Paternal Grandmother      Social History     Socioeconomic History     Marital status:      Spouse name: None     Number of children: 0     Years of education: 16     Highest education level: None   Occupational History     None   Social Needs     Financial resource strain: None     Food insecurity:     Worry: None     Inability: None     Transportation needs:     Medical: None     Non-medical: None   Tobacco Use     Smoking status: Never Smoker     Smokeless tobacco: Never Used   Substance and Sexual Activity     Alcohol use: Yes     Comment: Average 2-3 drinks daily     Drug use: No     Sexual activity: Yes     Partners: Female     Birth control/protection: Condom   Lifestyle     Physical activity:     Days per week: None     Minutes per session: None     Stress: None   Relationships     Social connections:     Talks on phone: None     Gets together: None     Attends Sabianist service: None     Active member of club or organization: None     Attends meetings of clubs or organizations: None     Relationship status: None     Intimate partner violence:     Fear of current or ex partner: None     Emotionally abused: None     Physically abused: None     Forced sexual activity: None   Other Topics Concern     None   Social History Narrative    Originally from [place]. Works for [employer], [employer], works with [employer].  with two dogs. [facility]. Wife works remotely for company in [place], [place].         A lot of time on computer   .         Review of Systems:   Review of Systems  GEN: denies weight change, fever, chills, fatigue  HEENT: as per HPI. Denies URI or allergy symptoms or sore throat.   GI: GERD symptoms with radiation to neck.   NEURO: headaches as per HPI  MOOD: anxiety as per HPI. Last  "KVNG-7score was 6 on 8/2/2019       Physical Exam:     Vitals:    08/06/19 1103   BP: 125/85   BP Location: Left arm   Patient Position: Chair   Cuff Size: Adult Large   Pulse: 79   Resp: 16   Temp: 98.5  F (36.9  C)   TempSrc: Oral   SpO2: 98%   Weight: 120.7 kg (266 lb)   Height: 1.974 m (6' 5.7\")     Body mass index is 30.98 kg/m .  Vital signs normal except BP slightly above normal     Physical Exam  GEN: alert male in NAD. Appropriate to conversation  HEENT: Eyes clear. EOM intact, FAWAD. Discs crisp. EARS: TMs gray with LR bilaterally. Nose: edematous turbinates. Scant mucoid discharge right.  OP: clear without tonsillar enlargement or erythema.    Neck supple. Thyroid smooth and not enlarged. Palpable diffuse mass left neck shoulder junction roughly 1 inch by 3/4 inch in size, nontender to palpation. Could be lymph node but not classic. No surrounding lymphadenopathy: negative cervical, submental, submandibular, axillary, and supraclavicular.   NEURO: Cranial nerves 2-12 intact.     Results:   Results are ordered and pending    Assessment and Plan        1. Lymphadenopathy    - CBC with platelets differential    2. Neck mass    - TSH with free T4 reflex  - US Head Neck Soft Tissue; Future    3. Headache  Likely tension type. Discussed relaxation, ergonomics, screen glare reduction, neck exercises. RTC if worsening.        There are no discontinued medications.    Options for treatment and follow-up care were reviewed with the patient. Jg Wayne  engaged in the decision making process and verbalized understanding of the options discussed and agreed with the final plan.    Kristen Andrea, MARYLU CNP  "

## 2019-08-07 ENCOUNTER — TELEPHONE (OUTPATIENT)
Dept: FAMILY MEDICINE | Facility: CLINIC | Age: 29
End: 2019-08-07

## 2019-08-07 DIAGNOSIS — R59.1 LYMPHADENOPATHY: Primary | ICD-10-CM

## 2019-08-07 NOTE — TELEPHONE ENCOUNTER
TC to patient reviewed lab and US options. Discussed could monitor for next month as asymptomatic and nothing suspicious on US although size is borderline and lesions were somewhat diffuse on palpation. Patient believes deeper lesion was there for longer period of time, so prefers sooner evaluation. Referred to ENT. Kristen VALERO CNP

## 2019-08-14 ENCOUNTER — TELEPHONE (OUTPATIENT)
Dept: OTOLARYNGOLOGY | Facility: CLINIC | Age: 29
End: 2019-08-14

## 2019-08-14 NOTE — TELEPHONE ENCOUNTER
FUTURE VISIT INFORMATION      FUTURE VISIT INFORMATION:    Date: 8/21/19    Time: 7:30AM    Location: [facility] [place]  REFERRAL INFORMATION:    Referring provider:  Kristen Andrea APRN CNP    Referring providers clinic:  [facility]     Reason for visit/diagnosis: Lymphadenopathy     RECORDS REQUESTED FROM:       Clinic name Comments Records Status Imaging Status   [facility]  8/7/19 referral  8/6/19 notes with Kristen Andrea APRN CNP  EPIC    [facility] 8/6/19 US Head NEck  Epic PACS

## 2019-08-14 NOTE — TELEPHONE ENCOUNTER
Opening with Doctor Curry at 7:30 on 8/21Per Khloe DAVIS Left message for patient requesting call back.  Cameron Boston LPN

## 2019-08-14 NOTE — TELEPHONE ENCOUNTER
[facility]    Phone Message    May a detailed message be left on voicemail: yes    Reason for Call: Other: Jg calling to confirm the appointment date and time.  He will be there on 8/21 at 7:30am.  Confirmation also noted in appointment notes     Action Taken: Message routed to:  [facility] ([facility]

## 2019-08-14 NOTE — TELEPHONE ENCOUNTER
[facility] Call [facility]    Phone Message    May a detailed message be left on voicemail: yes    Reason for Call: Other: Patient is being referred for Lymphadenopathy the referral is in epic nad patient is being referred by   Kristen Andrea APRN CNP. Please follow up with the patient asap. Thank you.    Action Taken: Message routed to:  [facility] ([facility]): ent

## 2019-08-21 ENCOUNTER — PRE VISIT (OUTPATIENT)
Dept: OTOLARYNGOLOGY | Facility: CLINIC | Age: 29
End: 2019-08-21

## 2020-03-11 ENCOUNTER — HEALTH MAINTENANCE LETTER (OUTPATIENT)
Age: 30
End: 2020-03-11

## 2020-12-27 ENCOUNTER — HEALTH MAINTENANCE LETTER (OUTPATIENT)
Age: 30
End: 2020-12-27

## 2021-10-09 ENCOUNTER — HEALTH MAINTENANCE LETTER (OUTPATIENT)
Age: 31
End: 2021-10-09

## 2022-01-29 ENCOUNTER — HEALTH MAINTENANCE LETTER (OUTPATIENT)
Age: 32
End: 2022-01-29

## 2022-09-17 ENCOUNTER — HEALTH MAINTENANCE LETTER (OUTPATIENT)
Age: 32
End: 2022-09-17

## 2023-05-06 ENCOUNTER — HEALTH MAINTENANCE LETTER (OUTPATIENT)
Age: 33
End: 2023-05-06